# Patient Record
Sex: FEMALE | Race: WHITE | NOT HISPANIC OR LATINO | Employment: UNEMPLOYED | URBAN - NONMETROPOLITAN AREA
[De-identification: names, ages, dates, MRNs, and addresses within clinical notes are randomized per-mention and may not be internally consistent; named-entity substitution may affect disease eponyms.]

---

## 2023-01-03 ENCOUNTER — OFFICE VISIT (OUTPATIENT)
Dept: ENDOCRINOLOGY | Facility: CLINIC | Age: 48
End: 2023-01-03
Payer: COMMERCIAL

## 2023-01-03 VITALS
DIASTOLIC BLOOD PRESSURE: 74 MMHG | HEART RATE: 77 BPM | SYSTOLIC BLOOD PRESSURE: 128 MMHG | BODY MASS INDEX: 39.36 KG/M2 | WEIGHT: 290.6 LBS | HEIGHT: 72 IN | OXYGEN SATURATION: 100 %

## 2023-01-03 DIAGNOSIS — R53.83 FATIGUE, UNSPECIFIED TYPE: ICD-10-CM

## 2023-01-03 DIAGNOSIS — R79.89 LOW SERUM CORTISOL LEVEL: Primary | ICD-10-CM

## 2023-01-03 PROCEDURE — 99204 OFFICE O/P NEW MOD 45 MIN: CPT | Performed by: NURSE PRACTITIONER

## 2023-01-03 PROCEDURE — 1160F RVW MEDS BY RX/DR IN RCRD: CPT | Performed by: NURSE PRACTITIONER

## 2023-01-03 PROCEDURE — 1159F MED LIST DOCD IN RCRD: CPT | Performed by: NURSE PRACTITIONER

## 2023-01-03 RX ORDER — CETIRIZINE HYDROCHLORIDE 10 MG/1
10 TABLET ORAL DAILY
COMMUNITY

## 2023-01-03 RX ORDER — SERTRALINE HYDROCHLORIDE 100 MG/1
100 TABLET, FILM COATED ORAL DAILY
COMMUNITY

## 2023-01-03 NOTE — ASSESSMENT & PLAN NOTE
Discussed causes of symptoms with patient.  Will obtain labs and treat and follow as indicated.  Follow-up in 3 months.

## 2023-01-03 NOTE — PROGRESS NOTES
Chief Complaint   Patient presents with   • addisons disease     Pt is extremely tired and craving salt.         Referring Provider  Carmencita Del Castillo APRN HPI   Jo Ann Meza is a 47 y.o. female had concerns including addisons disease (Pt is extremely tired and craving salt. ).  Seen as a new patient.  Carlos's Disease.    Symptoms:  Extreme tiredness: yes  Weak muscles: yes  Reduced appetite: none  Weight loss: none  Darkening of the skin (hyperpigmentation): none  Reduced heart rate or low blood pressure: yes, lower BP  Light-headedness and fainting: none unless hypoglycemic  Salt craving: yes  Hypoglycemia: yes  Nausea or vomiting: none  Diarrhea: yes  Abdominal pain: none  Muscle or joint pains: yes  Irritability: yes  Depression: yes  Body hair loss or sexual dysfunction in women: no hair loss, no sexual dysfunctional    Her mother had adrenal gland carcinoma with other complications.  She passed away from this.    No past medical history on file.  No past surgical history on file.   No family history on file.   Social History     Socioeconomic History   • Marital status:    Tobacco Use   • Smoking status: Never   • Smokeless tobacco: Never   Substance and Sexual Activity   • Alcohol use: Yes   • Drug use: Never   • Sexual activity: Defer      Allergies   Allergen Reactions   • Diflucan [Fluconazole] Swelling      Current Outpatient Medications on File Prior to Visit   Medication Sig Dispense Refill   • cetirizine (zyrTEC) 10 MG tablet Take 10 mg by mouth Daily.     • sertraline (ZOLOFT) 100 MG tablet Take 100 mg by mouth Daily.       No current facility-administered medications on file prior to visit.        The following portions of the patient's history were reviewed and updated as appropriate: allergies, current medications, past family history, past medical history, past social history, past surgical history and problem list.    Review of Systems   Constitutional: Positive for activity  change, appetite change, fatigue and unexpected weight gain.        Salt cravings, lower BP   Eyes: Positive for visual disturbance.   Skin: Negative for color change.   Psychiatric/Behavioral: Positive for sleep disturbance.   All other systems reviewed and are negative.     /74 (BP Location: Left arm, Patient Position: Sitting, Cuff Size: Adult)   Pulse 77   Ht 182.9 cm (72\")   Wt 132 kg (290 lb 9.6 oz)   SpO2 100%   BMI 39.41 kg/m²      Physical Exam  Vitals reviewed.   Constitutional:       Appearance: Normal appearance.   Eyes:      Extraocular Movements: Extraocular movements intact.   Cardiovascular:      Rate and Rhythm: Normal rate.   Pulmonary:      Effort: Pulmonary effort is normal.   Neurological:      General: No focal deficit present.      Mental Status: She is alert and oriented to person, place, and time.   Psychiatric:         Mood and Affect: Mood normal.         Behavior: Behavior normal.         Thought Content: Thought content normal.         Judgment: Judgment normal.        Labs/Imaging  CMP:     Lipid Panel:  No results found for: CHOL, TRIG, HDL, VLDL, LDL  HbA1c:     Glucose:  No results found for: POCGLU  TSH:  No results found for: TSH    Assessment and Plan    Diagnoses and all orders for this visit:    1. Low serum cortisol level (Primary)  Assessment & Plan:  Discussed causes of symptoms with patient.  Will obtain labs and treat and follow as indicated.  Follow-up in 3 months.    Orders:  -     Cancel: Cortisol  -     Cancel: ACTH  -     Cancel: Comprehensive Metabolic Panel  -     ACTH; Future  -     Comprehensive Metabolic Panel; Future  -     Cortisol; Future    2. Fatigue, unspecified type  Assessment & Plan:  Discussed causes of symptoms with patient.  Will obtain labs and treat and follow as indicated.  Follow-up in 3 months.    Orders:  -     Cancel: Thyroid Antibodies  -     Cancel: TSH  -     Cancel: T4, Free  -     T4, Free; Future  -     Thyroid Antibodies;  Future  -     TSH; Future       Return in about 3 months (around 4/3/2023) for Follow-up appointment. The patient was instructed to contact the clinic with any interval questions or concerns.      This document has been electronically signed by LYNDON Garzon  January 3, 2023 11:44 EST   Endocrinology

## 2023-01-17 DIAGNOSIS — R79.89 LOW SERUM CORTISOL LEVEL: Primary | ICD-10-CM
